# Patient Record
Sex: FEMALE | Race: WHITE | URBAN - METROPOLITAN AREA
[De-identification: names, ages, dates, MRNs, and addresses within clinical notes are randomized per-mention and may not be internally consistent; named-entity substitution may affect disease eponyms.]

---

## 2019-05-02 ENCOUNTER — OFFICE VISIT (OUTPATIENT)
Dept: URBAN - METROPOLITAN AREA CLINIC 29 | Facility: CLINIC | Age: 64
End: 2019-05-02
Payer: COMMERCIAL

## 2019-05-02 DIAGNOSIS — H52.223 REGULAR ASTIGMATISM, BILATERAL: Primary | ICD-10-CM

## 2019-05-02 PROCEDURE — 92002 INTRM OPH EXAM NEW PATIENT: CPT | Performed by: OPTOMETRIST

## 2019-05-02 PROCEDURE — 92015 DETERMINE REFRACTIVE STATE: CPT | Performed by: OPTOMETRIST

## 2019-05-02 ASSESSMENT — INTRAOCULAR PRESSURE
OS: 16
OD: 16

## 2019-05-02 ASSESSMENT — VISUAL ACUITY
OS: 20/20
OD: 20/20

## 2019-05-02 NOTE — IMPRESSION/PLAN
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 Plan: Discussed diagnosis in detail with patient. No treatment is required at this time. Will continue to observe condition and or symptoms. Discussed signs and symptoms of retinal detachment. Discussed signs and symptoms of PVD/floaters. Discussed risks of progression. Call if symptoms worsens.

## 2020-05-20 ENCOUNTER — OFFICE VISIT (OUTPATIENT)
Dept: URBAN - METROPOLITAN AREA CLINIC 29 | Facility: CLINIC | Age: 65
End: 2020-05-20
Payer: COMMERCIAL

## 2020-05-20 DIAGNOSIS — H35.413 LATTICE DEGENERATION OF RETINA, BILATERAL: ICD-10-CM

## 2020-05-20 PROCEDURE — 92012 INTRM OPH EXAM EST PATIENT: CPT | Performed by: OPTOMETRIST

## 2020-05-20 ASSESSMENT — INTRAOCULAR PRESSURE
OS: 16
OD: 16

## 2020-05-20 ASSESSMENT — VISUAL ACUITY
OS: 20/20
OD: 20/20

## 2020-05-20 NOTE — IMPRESSION/PLAN
Impression: Lattice degeneration of retina, bilateral: H35.413 OU. Condition: established, stable. Plan: Discussed diagnosis in detail with patient. No treatment is required at this time. Will continue to observe condition and or symptoms. Reassured patient of current condition and treatment. Call if Symptoms occur.

## 2020-06-10 ENCOUNTER — TESTING ONLY (OUTPATIENT)
Dept: URBAN - METROPOLITAN AREA CLINIC 29 | Facility: CLINIC | Age: 65
End: 2020-06-10

## 2020-06-10 PROCEDURE — V2799 MISC VISION ITEM OR SERVICE: HCPCS | Performed by: OPTOMETRIST

## 2020-06-10 ASSESSMENT — VISUAL ACUITY
OD: 20/20
OS: 20/20